# Patient Record
Sex: MALE | Race: WHITE | Employment: FULL TIME | URBAN - METROPOLITAN AREA
[De-identification: names, ages, dates, MRNs, and addresses within clinical notes are randomized per-mention and may not be internally consistent; named-entity substitution may affect disease eponyms.]

---

## 2017-03-13 ENCOUNTER — NURSE ONLY (OUTPATIENT)
Dept: FAMILY MEDICINE CLINIC | Facility: CLINIC | Age: 52
End: 2017-03-13

## 2017-03-13 DIAGNOSIS — R73.09 ABNORMAL GLUCOSE: ICD-10-CM

## 2017-03-13 DIAGNOSIS — Z00.00 LABORATORY EXAM ORDERED AS PART OF ROUTINE GENERAL MEDICAL EXAMINATION: Primary | ICD-10-CM

## 2017-03-13 LAB
ALBUMIN SERPL BCP-MCNC: 4.2 G/DL (ref 3.5–4.8)
ALBUMIN/GLOB SERPL: 1.8 {RATIO} (ref 1–2)
ALP SERPL-CCNC: 80 U/L (ref 32–100)
ALT SERPL-CCNC: 62 U/L (ref 17–63)
ANION GAP SERPL CALC-SCNC: 9 MMOL/L (ref 0–18)
AST SERPL-CCNC: 30 U/L (ref 15–41)
BASOPHILS # BLD: 0 K/UL (ref 0–0.2)
BASOPHILS NFR BLD: 1 %
BILIRUB SERPL-MCNC: 0.7 MG/DL (ref 0.3–1.2)
BUN SERPL-MCNC: 14 MG/DL (ref 8–20)
BUN/CREAT SERPL: 13.6 (ref 10–20)
CALCIUM SERPL-MCNC: 9.5 MG/DL (ref 8.5–10.5)
CHLORIDE SERPL-SCNC: 103 MMOL/L (ref 95–110)
CHOLEST SERPL-MCNC: 199 MG/DL (ref 110–200)
CO2 SERPL-SCNC: 24 MMOL/L (ref 22–32)
CREAT SERPL-MCNC: 1.03 MG/DL (ref 0.5–1.5)
EOSINOPHIL # BLD: 0.4 K/UL (ref 0–0.7)
EOSINOPHIL NFR BLD: 7 %
ERYTHROCYTE [DISTWIDTH] IN BLOOD BY AUTOMATED COUNT: 13.2 % (ref 11–15)
GLOBULIN PLAS-MCNC: 2.3 G/DL (ref 2.5–3.7)
GLUCOSE SERPL-MCNC: 265 MG/DL (ref 70–99)
HCT VFR BLD AUTO: 48.8 % (ref 41–52)
HDLC SERPL-MCNC: 30 MG/DL
HGB BLD-MCNC: 16.6 G/DL (ref 13.5–17.5)
LYMPHOCYTES # BLD: 1.1 K/UL (ref 1–4)
LYMPHOCYTES NFR BLD: 21 %
MCH RBC QN AUTO: 31.6 PG (ref 27–32)
MCHC RBC AUTO-ENTMCNC: 34 G/DL (ref 32–37)
MCV RBC AUTO: 92.9 FL (ref 80–100)
MONOCYTES # BLD: 0.4 K/UL (ref 0–1)
MONOCYTES NFR BLD: 8 %
NEUTROPHILS # BLD AUTO: 3.5 K/UL (ref 1.8–7.7)
NEUTROPHILS NFR BLD: 64 %
NONHDLC SERPL-MCNC: 169 MG/DL
OSMOLALITY UR CALC.SUM OF ELEC: 292 MOSM/KG (ref 275–295)
PLATELET # BLD AUTO: 110 K/UL (ref 140–400)
PMV BLD AUTO: 10.9 FL (ref 7.4–10.3)
POTASSIUM SERPL-SCNC: 4.6 MMOL/L (ref 3.3–5.1)
PROT SERPL-MCNC: 6.5 G/DL (ref 5.9–8.4)
RBC # BLD AUTO: 5.25 M/UL (ref 4.5–5.9)
SODIUM SERPL-SCNC: 136 MMOL/L (ref 136–144)
TRIGL SERPL-MCNC: 578 MG/DL (ref 1–149)
WBC # BLD AUTO: 5.5 K/UL (ref 4–11)

## 2017-03-13 PROCEDURE — 80053 COMPREHEN METABOLIC PANEL: CPT | Performed by: FAMILY MEDICINE

## 2017-03-13 PROCEDURE — 36415 COLL VENOUS BLD VENIPUNCTURE: CPT | Performed by: FAMILY MEDICINE

## 2017-03-13 PROCEDURE — 80061 LIPID PANEL: CPT | Performed by: FAMILY MEDICINE

## 2017-03-13 PROCEDURE — 85025 COMPLETE CBC W/AUTO DIFF WBC: CPT | Performed by: FAMILY MEDICINE

## 2017-03-13 PROCEDURE — 83036 HEMOGLOBIN GLYCOSYLATED A1C: CPT | Performed by: FAMILY MEDICINE

## 2017-03-14 LAB — HBA1C MFR BLD: 8.9 % (ref 4–6)

## 2017-03-15 RX ORDER — CARVEDILOL PHOSPHATE 80 MG
CAPSULE,EXTENDED RELEASE MULTIPHASE 24HR ORAL
Qty: 30 CAPSULE | Refills: 0 | Status: SHIPPED | OUTPATIENT
Start: 2017-03-15 | End: 2017-03-27

## 2017-03-15 RX ORDER — LOSARTAN POTASSIUM 100 MG/1
TABLET ORAL
Qty: 30 TABLET | Refills: 0 | Status: SHIPPED | OUTPATIENT
Start: 2017-03-15 | End: 2017-04-16

## 2017-03-27 ENCOUNTER — OFFICE VISIT (OUTPATIENT)
Dept: FAMILY MEDICINE CLINIC | Facility: CLINIC | Age: 52
End: 2017-03-27

## 2017-03-27 VITALS
HEIGHT: 68 IN | WEIGHT: 214 LBS | SYSTOLIC BLOOD PRESSURE: 140 MMHG | DIASTOLIC BLOOD PRESSURE: 92 MMHG | BODY MASS INDEX: 32.43 KG/M2

## 2017-03-27 DIAGNOSIS — I10 ESSENTIAL HYPERTENSION: ICD-10-CM

## 2017-03-27 DIAGNOSIS — E78.5 HYPERLIPIDEMIA, UNSPECIFIED HYPERLIPIDEMIA TYPE: ICD-10-CM

## 2017-03-27 DIAGNOSIS — Z00.00 ROUTINE GENERAL MEDICAL EXAMINATION AT A HEALTH CARE FACILITY: Primary | ICD-10-CM

## 2017-03-27 DIAGNOSIS — E11.9 TYPE 2 DIABETES MELLITUS WITHOUT COMPLICATION, WITHOUT LONG-TERM CURRENT USE OF INSULIN (HCC): ICD-10-CM

## 2017-03-27 DIAGNOSIS — Z72.0 TOBACCO ABUSE: ICD-10-CM

## 2017-03-27 PROCEDURE — 99396 PREV VISIT EST AGE 40-64: CPT | Performed by: FAMILY MEDICINE

## 2017-03-27 PROCEDURE — 99214 OFFICE O/P EST MOD 30 MIN: CPT | Performed by: FAMILY MEDICINE

## 2017-03-27 RX ORDER — AMLODIPINE BESYLATE AND BENAZEPRIL HYDROCHLORIDE 10; 40 MG/1; MG/1
1 CAPSULE ORAL DAILY
Qty: 90 CAPSULE | Refills: 3 | Status: SHIPPED | OUTPATIENT
Start: 2017-03-27 | End: 2019-05-20

## 2017-03-27 RX ORDER — FLUTICASONE PROPIONATE 50 MCG
SPRAY, SUSPENSION (ML) NASAL DAILY
COMMUNITY
End: 2017-03-27

## 2017-03-27 RX ORDER — FLUTICASONE PROPIONATE 50 MCG
2 SPRAY, SUSPENSION (ML) NASAL DAILY
Qty: 1 BOTTLE | Refills: 0 | Status: SHIPPED | OUTPATIENT
Start: 2017-03-27 | End: 2017-04-18

## 2017-03-27 RX ORDER — CARVEDILOL PHOSPHATE 80 MG/1
80 CAPSULE, EXTENDED RELEASE ORAL
Qty: 90 CAPSULE | Refills: 3 | Status: SHIPPED | OUTPATIENT
Start: 2017-03-27 | End: 2018-04-05

## 2017-03-27 NOTE — PROGRESS NOTES
CC: Annual Physical Exam    HPI:   Vashti Putnam is a 54year old male who presents for a complete physical exam.    Wt Readings from Last 6 Encounters:  03/27/17 : 214 lb    Body mass index is 32.55 kg/(m^2).        Results for orders placed or performed in v Disp: 90 capsule Rfl: 3   Empagliflozin (JARDIANCE) 25 MG Oral Tab Take 1 tablet by mouth daily. Disp: 90 tablet Rfl: 3   Linagliptin-Metformin HCl (JENTADUETO) 2.5-1000 MG Oral Tab Take 1 tablet by mouth 2 (two) times daily.  Disp: 60 tablet Rfl: 0   Choli or polydipsia. ALLERGIES:  Denies allergic response, history of asthma, sneezing, hives, eczema or rhinitis.     EXAM:   /92 mmHg  Ht 68\"  Wt 214 lb  BMI 32.55 kg/m2 Estimated body mass index is 32.55 kg/(m^2) as calculated from the following:    He ([facility] [address]  Start jardiance 25 and jentadueto 2.5-1000 bid    4. Hyperlipidemia, unspecified hyperlipidemia type  trilipix 135    5. Tobacco abuse  Will discuss next visit  F\u 2 weeks.   see meds    Annual Depression Screen due on 08[birth year]12/1965  Annual Physical du

## 2017-03-28 ENCOUNTER — TELEPHONE (OUTPATIENT)
Dept: FAMILY MEDICINE CLINIC | Facility: CLINIC | Age: 52
End: 2017-03-28

## 2017-03-28 NOTE — TELEPHONE ENCOUNTER
Patient was wondering if he could take all his medication together, and if he continues to take his bp medication along with new one's.  Advised patient that he should cont with his bp meds and can take together make sure he eats something so his stomach do

## 2017-04-10 ENCOUNTER — OFFICE VISIT (OUTPATIENT)
Dept: FAMILY MEDICINE CLINIC | Facility: CLINIC | Age: 52
End: 2017-04-10

## 2017-04-10 ENCOUNTER — TELEPHONE (OUTPATIENT)
Dept: FAMILY MEDICINE CLINIC | Facility: CLINIC | Age: 52
End: 2017-04-10

## 2017-04-10 VITALS — DIASTOLIC BLOOD PRESSURE: 60 MMHG | BODY MASS INDEX: 32 KG/M2 | WEIGHT: 208 LBS | SYSTOLIC BLOOD PRESSURE: 110 MMHG

## 2017-04-10 DIAGNOSIS — E11.9 TYPE 2 DIABETES MELLITUS WITHOUT COMPLICATION, WITHOUT LONG-TERM CURRENT USE OF INSULIN (HCC): ICD-10-CM

## 2017-04-10 DIAGNOSIS — I10 ESSENTIAL HYPERTENSION: Primary | ICD-10-CM

## 2017-04-10 PROCEDURE — 99213 OFFICE O/P EST LOW 20 MIN: CPT | Performed by: FAMILY MEDICINE

## 2017-04-10 NOTE — PROGRESS NOTES
HPI:   Khris Dao is a 54year old male who presents for recheck of his diabetes. Patient’s FBS have been 150-104. Pt.has been checking his feet on a regular basis. Pt denies any tingling of the feet. No wound nor ulcerations.  Pt denies any issues with dep Monocyte % 8 %   Eosinophil % 7 %   Basophil % 1 %   Neutrophil Absolute 3.5 1.8-7.7 K/UL   Lymphocyte Absolute 1.1 1.0-4.0 K/UL   Monocyte Absolute 0.4 0.0-1.0 K/UL   Eosinophil Absolute 0.4 0.0-0.7 K/UL   Basophil Absolute 0.0 0.0-0.2 K/UL         Curr weakness, muscle aches, back pain, joint pain, swelling or stiffness. NEUROLOGICAL:  Denies headache, seizures, dizziness, syncope, paralysis, ataxia, numbness or tingling in the extremities,change in bowel or bladder control.   HEMATOLOGIC:  Denies anemia monofilament testing bilaterally. ASSESSMENT AND PLAN:   Kenya Perkins is a 54year old male who presents for a recheck of his diabetes, HTN, and dyslipidemia. Diabetic control is good.   Recommendations are: continue present meds, check HgbA1C, check fas

## 2017-04-17 RX ORDER — LOSARTAN POTASSIUM 100 MG/1
TABLET ORAL
Qty: 90 TABLET | Refills: 3 | Status: SHIPPED | OUTPATIENT
Start: 2017-04-17 | End: 2018-04-05

## 2017-04-19 RX ORDER — FLUTICASONE PROPIONATE 50 MCG
SPRAY, SUSPENSION (ML) NASAL
Qty: 1 BOTTLE | Refills: 5 | Status: SHIPPED | OUTPATIENT
Start: 2017-04-19 | End: 2018-05-07

## 2017-04-19 RX ORDER — LINAGLIPTIN AND METFORMIN HYDROCHLORIDE 2.5; 1 MG/1; MG/1
TABLET, FILM COATED ORAL
Qty: 60 TABLET | Refills: 5 | Status: SHIPPED | OUTPATIENT
Start: 2017-04-19 | End: 2018-05-07

## 2017-12-22 ENCOUNTER — TELEPHONE (OUTPATIENT)
Dept: FAMILY MEDICINE CLINIC | Facility: CLINIC | Age: 52
End: 2017-12-22

## 2017-12-22 ENCOUNTER — OFFICE VISIT (OUTPATIENT)
Dept: FAMILY MEDICINE CLINIC | Facility: CLINIC | Age: 52
End: 2017-12-22

## 2017-12-22 VITALS
OXYGEN SATURATION: 98 % | RESPIRATION RATE: 16 BRPM | DIASTOLIC BLOOD PRESSURE: 78 MMHG | SYSTOLIC BLOOD PRESSURE: 140 MMHG | HEIGHT: 68 IN | TEMPERATURE: 98 F | BODY MASS INDEX: 29.55 KG/M2 | HEART RATE: 73 BPM | WEIGHT: 195 LBS

## 2017-12-22 DIAGNOSIS — J01.00 ACUTE MAXILLARY SINUSITIS, RECURRENCE NOT SPECIFIED: Primary | ICD-10-CM

## 2017-12-22 DIAGNOSIS — R03.0 ELEVATED BLOOD PRESSURE READING: ICD-10-CM

## 2017-12-22 PROCEDURE — 99213 OFFICE O/P EST LOW 20 MIN: CPT | Performed by: NURSE PRACTITIONER

## 2017-12-22 RX ORDER — DOXYCYCLINE HYCLATE 100 MG/1
100 CAPSULE ORAL 2 TIMES DAILY
Qty: 14 CAPSULE | Refills: 0 | Status: SHIPPED | OUTPATIENT
Start: 2017-12-22 | End: 2017-12-29

## 2017-12-22 NOTE — PATIENT INSTRUCTIONS
· Return to clinic or follow up with PCP for further evaluation if symptoms are not improved within 48-72 hours  · Go to ER if facial or periorbital swelling develops  · Humidify the air  · Increase fluid intake  · Steam inhalation and warm compresses ofte 14. Aplique calor sobre las áreas de la torsten que le duelen. Use mouna toalla empapada en [place]. También puede pararse en la ducha y dejar que le caiga el [place] sobre la torsten.  Ésta es mouna buena manera de inhalar el vapor tibio del agua al tiem 25. Los antihistamínicos son útiles si la sinusitis se debe a mouna alergia. El más suave es la clorfeniramina (de venta sin receta). La dosis para los adultos es de 8-12 mg tiffanie veces por día.  [NOTA: No tome clorfeniramina si tiene glaucoma o si tiene probl © 6546-7772 [address] [facility] [facility]. Todos los derechos reservados. Esta información no pretende sustituir la atención médica profesional. Sólo zhang médico puede diagnosticar y tratar un problema de wendy. Ignacio médico podría recetarle medicamentos para tratar la sinusitis. Si usted tiene [place], los antibióticos pueden ayudar a curarla.  Si le recetan antibióticos, tome todas las pastillas a la hora que le toquen [employer] se le [place], [place] se sienta What side effects may I notice from receiving this medicine?   Side effects that you should report to your doctor or health care professional as soon as possible:  · allergic reactions like skin rash, itching or hives, swelling of the face, lips, or tongue Tell your doctor or health care professional if your symptoms do not improve. Do not treat diarrhea with over the counter products. Contact your doctor if you have diarrhea that lasts more than 2 days or if it is severe and watery.   Do not take this medic

## 2017-12-22 NOTE — PROGRESS NOTES
CHIEF COMPLAINT:   Patient presents with:  URI: X 4 days, ear pressure, rhinorreha, headache, chills, tactile fever, pharyngitis      HPI:   Isabelle Sales is a 50year old male who presents for upper respiratory symptoms for  4 days.  Patient reports sore thro • Diabetes [facility])    • Essential hypertension    • Sleep apnea       History reviewed. No pertinent surgical history.       Smoking status: Former Smoker                                                              Packs/day: 0.50      Years: 15.00        Kartik Mcclain Doxycycline Hyclate 100 MG Oral Cap 14 capsule 0      Sig: Take 1 capsule (100 mg total) by mouth 2 (two) times daily.  X 7 days       Discussed with patient s/s of worsening infection/condition and importance of prompt medical re-evaluation including when Los senos paranasales son cavidades llenas de aire dentro de los huesos de la torsten. Se conectan con la parte interna de la Aleksandra.  La sinusitis es mouna inflamación del tejido que recubre la cavidad del seno paranasal. Patricio inflamación puede presentarse lasha 17. Puede suleiman algún descongestionante sin receta a menos que le hayan recetado algún medicamento similar. Los sprays nasales son los que tienen [address] rápido.  Use alguno que contenga fenilefrina (Saman-Synephrine, Sinex, entre otros) o oximetazolina (A 20. Puede usar acetaminofén (Tylenol) o ibuprofeno (Motrin o Advil) para controlar el dolor, a menos que le hayan recetado otro medicamento.  [ Shira Stone: Si tiene mouna enfermedad hepática o renal crónica, o ha tenido alguna vez mouna úlcera estomacal, consulte con Beber más líquido de lo habitual—un vaso cada mouna o dos horas—lo ayudará a diluir el moco para que drene más fácilmente de los senos. Un humidificador ayuda de un modo muy parecido.  Los líquidos también pueden contrarrestar la resequedad que producen ciert Brand Names: Acticlate, Adoxa, Adoxa Laureano, Alodox, Avidoxy, Doxal, Mondoxyne NL, Monodox, Morgidox 1x, Morgidox 1x Kit, Morgidox 2x, Morgidox 2x Kit, NutriDox, Ocudox, TARGADOX, Vibramycin, Vibra-Tabs  What is this medicine?   DOXYCYCLINE (dox i Sony Martinez) i · barbiturates  · birth control pills  · bismuth subsalicylate  · carbamazepine  · methoxyflurane  · other antibiotics  · phenytoin  · vitamins that contain iron  · warfarin  What if I miss a dose? If you miss a dose, take it as soon as you can.  If it is If you are being treated for a sexually transmitted infection, avoid sexual contact until you have finished your treatment. Your sexual partner may also need treatment.   Avoid antacids, aluminum, calcium, magnesium, and iron products for 4 hours before and

## 2018-04-04 RX ORDER — EMPAGLIFLOZIN 25 MG/1
1 TABLET, FILM COATED ORAL DAILY
Qty: 90 TABLET | Refills: 0 | OUTPATIENT
Start: 2018-04-04 | End: 2018-05-04

## 2018-04-04 RX ORDER — CARVEDILOL PHOSPHATE 80 MG/1
CAPSULE, EXTENDED RELEASE ORAL
Qty: 90 CAPSULE | Refills: 0 | OUTPATIENT
Start: 2018-04-04

## 2018-04-04 RX ORDER — FENOFIBRIC ACID 135 MG/1
CAPSULE, DELAYED RELEASE ORAL
Qty: 90 CAPSULE | Refills: 0 | OUTPATIENT
Start: 2018-04-04

## 2018-04-05 RX ORDER — LOSARTAN POTASSIUM 100 MG/1
TABLET ORAL
Qty: 30 TABLET | Refills: 0 | Status: SHIPPED | OUTPATIENT
Start: 2018-04-05 | End: 2018-05-07

## 2018-04-05 RX ORDER — CARVEDILOL PHOSPHATE 80 MG/1
CAPSULE, EXTENDED RELEASE ORAL
Qty: 30 CAPSULE | Refills: 0 | Status: SHIPPED | OUTPATIENT
Start: 2018-04-05 | End: 2018-05-07

## 2018-04-05 RX ORDER — EMPAGLIFLOZIN 25 MG/1
TABLET, FILM COATED ORAL
Refills: 3 | COMMUNITY
Start: 2018-02-26 | End: 2018-04-10

## 2018-04-05 NOTE — TELEPHONE ENCOUNTER
Patient informed that we could only send for 1 month does need f/u appt.  Patient will call back to schedule

## 2019-02-25 RX ORDER — PERPHENAZINE 16 MG/1
TABLET, FILM COATED ORAL
Qty: 100 STRIP | Refills: 0 | OUTPATIENT
Start: 2019-02-25

## 2019-02-25 RX ORDER — LANCETS
EACH MISCELLANEOUS
Refills: 0 | OUTPATIENT
Start: 2019-02-25

## 2019-04-30 DIAGNOSIS — E11.9 TYPE 2 DIABETES MELLITUS WITHOUT COMPLICATION, WITHOUT LONG-TERM CURRENT USE OF INSULIN (HCC): ICD-10-CM

## 2019-04-30 DIAGNOSIS — I10 ESSENTIAL HYPERTENSION: ICD-10-CM

## 2019-05-01 RX ORDER — EMPAGLIFLOZIN 25 MG/1
1 TABLET, FILM COATED ORAL DAILY
Qty: 30 TABLET | Refills: 0 | OUTPATIENT
Start: 2019-05-01

## 2019-05-01 RX ORDER — LOSARTAN POTASSIUM 100 MG/1
TABLET ORAL
Qty: 30 TABLET | Refills: 0 | OUTPATIENT
Start: 2019-05-01

## 2019-05-01 RX ORDER — CARVEDILOL PHOSPHATE 80 MG/1
CAPSULE, EXTENDED RELEASE ORAL
Qty: 30 CAPSULE | Refills: 0 | OUTPATIENT
Start: 2019-05-01

## 2019-05-01 RX ORDER — FENOFIBRIC ACID 135 MG/1
CAPSULE, DELAYED RELEASE ORAL
Qty: 30 CAPSULE | Refills: 0 | OUTPATIENT
Start: 2019-05-01

## 2020-05-23 PROBLEM — E11.9 TYPE 2 DIABETES MELLITUS WITHOUT COMPLICATION, WITHOUT LONG-TERM CURRENT USE OF INSULIN (HCC): Status: ACTIVE | Noted: 2020-05-23

## 2020-09-19 ENCOUNTER — OFFICE VISIT (OUTPATIENT)
Dept: SLEEP CENTER | Age: 55
End: 2020-09-19
Attending: FAMILY MEDICINE
Payer: COMMERCIAL

## 2020-09-19 DIAGNOSIS — G47.33 OBSTRUCTIVE SLEEP APNEA SYNDROME: ICD-10-CM

## 2020-09-19 PROCEDURE — 95810 POLYSOM 6/> YRS 4/> PARAM: CPT

## 2020-09-23 NOTE — PROCEDURES
[address] [facility]  Accredited by the [place] of Sleep Medicine (AASM)    PATIENT'S NAME: Lilliana Rahman   ATTENDING PHYSICIAN: Andrade Murrieta. Tori Chapman MD   REFERRING PHYSICIAN: Andrade Murrieta.  Tori Chapman MD   PATIENT ACCOUNT #: [id number] LOCATION: [facility] arousal index is 37 events per hour and the spontaneous arousal index is 22 events per hour for a combined arousal index of 73 events per hour.   There were 380 periodic limb movements for a periodic limb movement index of 85 events per hour of which 14 per

## 2020-09-29 PROBLEM — A63.0 CONDYLOMA: Status: ACTIVE | Noted: 2020-09-29

## 2021-01-07 PROBLEM — R53.83 OTHER FATIGUE: Status: ACTIVE | Noted: 2021-01-07

## 2021-01-07 PROBLEM — R06.00 DYSPNEA: Status: ACTIVE | Noted: 2021-01-07

## 2021-01-07 PROBLEM — U07.1 COVID-19 VIRUS INFECTION: Status: ACTIVE | Noted: 2021-01-07

## 2021-04-12 ENCOUNTER — OFFICE VISIT (OUTPATIENT)
Dept: SURGERY | Facility: CLINIC | Age: 56
End: 2021-04-12
Payer: COMMERCIAL

## 2021-04-12 VITALS
HEIGHT: 68 IN | DIASTOLIC BLOOD PRESSURE: 90 MMHG | WEIGHT: 212 LBS | SYSTOLIC BLOOD PRESSURE: 160 MMHG | BODY MASS INDEX: 32.13 KG/M2 | HEART RATE: 73 BPM

## 2021-04-12 DIAGNOSIS — A63.0 CONDYLOMA: Primary | ICD-10-CM

## 2021-04-12 PROCEDURE — 3008F BODY MASS INDEX DOCD: CPT | Performed by: UROLOGY

## 2021-04-12 PROCEDURE — 99204 OFFICE O/P NEW MOD 45 MIN: CPT | Performed by: UROLOGY

## 2021-04-12 PROCEDURE — 3077F SYST BP >= 140 MM HG: CPT | Performed by: UROLOGY

## 2021-04-12 PROCEDURE — 3080F DIAST BP >= 90 MM HG: CPT | Performed by: UROLOGY

## 2021-04-12 NOTE — PROGRESS NOTES
SUBJECTIVE:  Heath Muro is a 50year old male who presents for a consultation at the request of, and a copy of this note will be sent to, Dr. 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 He states that the problem is unchanged.  Symptoms include has ha appears well, in no apparent distress. Alert and oriented times three, pleasant and cooperative.   CARDIOVASCULAR:normal apical impulse  RESPIRATORY: no chest wall deformities or tenderness  ABDOMEN: abdomen is soft without significant tenderness, masses,

## 2022-11-01 ENCOUNTER — LAB REQUISITION (OUTPATIENT)
Dept: SURGERY | Age: 57
End: 2022-11-01
Payer: COMMERCIAL

## 2022-11-01 DIAGNOSIS — Z12.11 SPECIAL SCREENING FOR MALIGNANT NEOPLASMS, COLON: ICD-10-CM

## 2022-11-01 PROCEDURE — 88305 TISSUE EXAM BY PATHOLOGIST: CPT | Performed by: SURGERY

## (undated) NOTE — MR AVS SNAPSHOT
[address] at [facility]  [address]  [facility], [address] [facility]                Thank you for choosing us for your health care visit with Sade Kyle MD.  We are glad to serve you and happy to provide Commonly known as:  Enoch Bring                Where to Get Your Medications      [facility]  [address], [address] [place] [address] [address] [facility], [phone], [address] [place] [address] Street

## (undated) NOTE — MR AVS SNAPSHOT
[address] at [facility]  [address]  [facility], [address] [facility] [address]               Thank you for choosing us for your health care visit with Griselda Madrid MD.  We are glad to serve you and happy to provide Fluticasone Propionate 50 MCG/ACT Susp   2 sprays by Each Nare route daily. What changed:  how much to take   Commonly known as:  FLONASE           Linagliptin-Metformin HCl 2.5-1000 MG Tabs   Take 1 tablet by mouth 2 (two) times daily.    Commonly known EAT THESE FOODS MORE OFTEN: EAT THESE FOODS LESS OFTEN:   Make half your plate fruits and vegetables Highly refined, white starches including white bread, rice and pasta   Eat plenty of protein, keep the fat content low Sugars:  sodas and sports drinks, ca

## (undated) NOTE — MR AVS SNAPSHOT
[address] at [facility]  [address] [facility], [address]               Thank you for choosing us for your health care visit with [facility] 8 MP NURSE.   We are glad to serve you and happy to provide Total Protein 6.5 5.9-8.4 g/dL    Albumin 4.2 3.5-4.8 g/dL    Globulin 2.3 2.5-3.7 g/dL    A/G Ratio 1.8 1.0-2.0    Anion Gap 9 0-18    BUN/CREA Ratio 13.6 10.0-20.0    Calculated Osmolality 292 275-295 mOsm/kg    GFR, Non- >60 >=60    GFR Monocyte % 8 %    Eosinophil % 7 %    Basophil % 1 %    Neutrophil Absolute 3.5 1.8-7.7 K/UL    Lymphocyte Absolute 1.1 1.0-4.0 K/UL    Monocyte Absolute 0.4 0.0-1.0 K/UL    Eosinophil Absolute 0.4 0.0-0.7 K/UL    Basophil Absolute 0.0 0.0-0.2 K/UL

## (undated) NOTE — LETTER
Date: 12/22/2017    Patient Name: Mickie Gonzales          To Whom it may concern: This letter has been written at the patient's request. The above patient was seen at the [facility] for treatment of a medical condition.     This patient should